# Patient Record
Sex: MALE | Race: WHITE | NOT HISPANIC OR LATINO | Employment: FULL TIME | URBAN - METROPOLITAN AREA
[De-identification: names, ages, dates, MRNs, and addresses within clinical notes are randomized per-mention and may not be internally consistent; named-entity substitution may affect disease eponyms.]

---

## 2017-02-28 ENCOUNTER — ALLSCRIPTS OFFICE VISIT (OUTPATIENT)
Dept: OTHER | Facility: OTHER | Age: 62
End: 2017-02-28

## 2017-02-28 DIAGNOSIS — G47.33 OBSTRUCTIVE SLEEP APNEA: ICD-10-CM

## 2017-02-28 DIAGNOSIS — Z98.84 BARIATRIC SURGERY STATUS: ICD-10-CM

## 2017-02-28 DIAGNOSIS — K91.2 POSTSURGICAL MALABSORPTION, NOT ELSEWHERE CLASSIFIED: ICD-10-CM

## 2017-02-28 DIAGNOSIS — Z00.00 ENCOUNTER FOR GENERAL ADULT MEDICAL EXAMINATION WITHOUT ABNORMAL FINDINGS: ICD-10-CM

## 2017-02-28 DIAGNOSIS — I10 ESSENTIAL (PRIMARY) HYPERTENSION: ICD-10-CM

## 2017-02-28 DIAGNOSIS — E11.9 TYPE 2 DIABETES MELLITUS WITHOUT COMPLICATIONS (HCC): ICD-10-CM

## 2017-03-07 ENCOUNTER — APPOINTMENT (OUTPATIENT)
Dept: LAB | Facility: HOSPITAL | Age: 62
End: 2017-03-07
Payer: COMMERCIAL

## 2017-03-07 ENCOUNTER — TRANSCRIBE ORDERS (OUTPATIENT)
Dept: ADMINISTRATIVE | Facility: HOSPITAL | Age: 62
End: 2017-03-07

## 2017-03-07 ENCOUNTER — APPOINTMENT (OUTPATIENT)
Dept: LAB | Facility: HOSPITAL | Age: 62
End: 2017-03-07
Attending: PODIATRIST
Payer: COMMERCIAL

## 2017-03-07 DIAGNOSIS — K59.00 UNSPECIFIED CONSTIPATION: ICD-10-CM

## 2017-03-07 DIAGNOSIS — E11.9 TYPE 2 DIABETES MELLITUS WITHOUT COMPLICATION, WITH LONG-TERM CURRENT USE OF INSULIN (HCC): ICD-10-CM

## 2017-03-07 DIAGNOSIS — Z79.4 TYPE 2 DIABETES MELLITUS WITHOUT COMPLICATION, WITH LONG-TERM CURRENT USE OF INSULIN (HCC): ICD-10-CM

## 2017-03-07 DIAGNOSIS — G47.33 OBSTRUCTIVE SLEEP APNEA: ICD-10-CM

## 2017-03-07 DIAGNOSIS — E11.9 TYPE 2 DIABETES MELLITUS WITHOUT COMPLICATIONS (HCC): ICD-10-CM

## 2017-03-07 DIAGNOSIS — I10 ESSENTIAL (PRIMARY) HYPERTENSION: ICD-10-CM

## 2017-03-07 DIAGNOSIS — Z98.84 BARIATRIC SURGERY STATUS: ICD-10-CM

## 2017-03-07 DIAGNOSIS — K91.2 POSTSURGICAL MALABSORPTION, NOT ELSEWHERE CLASSIFIED: ICD-10-CM

## 2017-03-07 DIAGNOSIS — Z00.00 ENCOUNTER FOR GENERAL ADULT MEDICAL EXAMINATION WITHOUT ABNORMAL FINDINGS: ICD-10-CM

## 2017-03-07 DIAGNOSIS — E11.9 TYPE 2 DIABETES MELLITUS WITHOUT COMPLICATION, WITH LONG-TERM CURRENT USE OF INSULIN (HCC): Primary | ICD-10-CM

## 2017-03-07 DIAGNOSIS — Z79.4 TYPE 2 DIABETES MELLITUS WITHOUT COMPLICATION, WITH LONG-TERM CURRENT USE OF INSULIN (HCC): Primary | ICD-10-CM

## 2017-03-07 LAB
25(OH)D3 SERPL-MCNC: 42.4 NG/ML (ref 30–100)
ALBUMIN SERPL BCP-MCNC: 3.5 G/DL (ref 3.5–5)
ALP SERPL-CCNC: 88 U/L (ref 46–116)
ALT SERPL W P-5'-P-CCNC: 43 U/L (ref 12–78)
ANION GAP SERPL CALCULATED.3IONS-SCNC: 9 MMOL/L (ref 4–13)
AST SERPL W P-5'-P-CCNC: 21 U/L (ref 5–45)
BASOPHILS # BLD AUTO: 0.03 THOUSANDS/ΜL (ref 0–0.1)
BASOPHILS NFR BLD AUTO: 1 % (ref 0–1)
BILIRUB SERPL-MCNC: 1 MG/DL (ref 0.2–1)
BUN SERPL-MCNC: 16 MG/DL (ref 5–25)
CALCIUM SERPL-MCNC: 8.6 MG/DL (ref 8.3–10.1)
CHLORIDE SERPL-SCNC: 104 MMOL/L (ref 100–108)
CHOLEST SERPL-MCNC: 159 MG/DL (ref 50–200)
CO2 SERPL-SCNC: 29 MMOL/L (ref 21–32)
CREAT SERPL-MCNC: 0.68 MG/DL (ref 0.6–1.3)
EOSINOPHIL # BLD AUTO: 0.17 THOUSAND/ΜL (ref 0–0.61)
EOSINOPHIL NFR BLD AUTO: 3 % (ref 0–6)
ERYTHROCYTE [DISTWIDTH] IN BLOOD BY AUTOMATED COUNT: 14.6 % (ref 11.6–15.1)
EST. AVERAGE GLUCOSE BLD GHB EST-MCNC: 151 MG/DL
FERRITIN SERPL-MCNC: 228 NG/ML (ref 8–388)
FOLATE SERPL-MCNC: >20 NG/ML (ref 3.1–17.5)
GFR SERPL CREATININE-BSD FRML MDRD: >60 ML/MIN/1.73SQ M
GLUCOSE SERPL-MCNC: 158 MG/DL (ref 65–140)
HBA1C MFR BLD: 6.9 % (ref 4.2–6.3)
HCT VFR BLD AUTO: 41.2 % (ref 36.5–49.3)
HDLC SERPL-MCNC: 51 MG/DL (ref 40–60)
HGB BLD-MCNC: 14.2 G/DL (ref 12–17)
LDLC SERPL CALC-MCNC: 87 MG/DL (ref 0–100)
LYMPHOCYTES # BLD AUTO: 2.37 THOUSANDS/ΜL (ref 0.6–4.47)
LYMPHOCYTES NFR BLD AUTO: 37 % (ref 14–44)
MCH RBC QN AUTO: 31.8 PG (ref 26.8–34.3)
MCHC RBC AUTO-ENTMCNC: 34.5 G/DL (ref 31.4–37.4)
MCV RBC AUTO: 92 FL (ref 82–98)
MONOCYTES # BLD AUTO: 0.39 THOUSAND/ΜL (ref 0.17–1.22)
MONOCYTES NFR BLD AUTO: 6 % (ref 4–12)
NEUTROPHILS # BLD AUTO: 3.4 THOUSANDS/ΜL (ref 1.85–7.62)
NEUTS SEG NFR BLD AUTO: 53 % (ref 43–75)
PLATELET # BLD AUTO: 257 THOUSANDS/UL (ref 149–390)
PMV BLD AUTO: 9.2 FL (ref 8.9–12.7)
POTASSIUM SERPL-SCNC: 3.9 MMOL/L (ref 3.5–5.3)
PROT SERPL-MCNC: 6.6 G/DL (ref 6.4–8.2)
PTH-INTACT SERPL-MCNC: 33.9 PG/ML (ref 14–72)
RBC # BLD AUTO: 4.47 MILLION/UL (ref 3.88–5.62)
SODIUM SERPL-SCNC: 142 MMOL/L (ref 136–145)
TRIGL SERPL-MCNC: 104 MG/DL
TSH SERPL DL<=0.05 MIU/L-ACNC: 1.85 UIU/ML (ref 0.36–3.74)
VIT B12 SERPL-MCNC: 2683 PG/ML (ref 100–900)
WBC # BLD AUTO: 6.36 THOUSAND/UL (ref 4.31–10.16)

## 2017-03-07 PROCEDURE — 82728 ASSAY OF FERRITIN: CPT

## 2017-03-07 PROCEDURE — 80053 COMPREHEN METABOLIC PANEL: CPT

## 2017-03-07 PROCEDURE — 82746 ASSAY OF FOLIC ACID SERUM: CPT

## 2017-03-07 PROCEDURE — 36415 COLL VENOUS BLD VENIPUNCTURE: CPT

## 2017-03-07 PROCEDURE — 82607 VITAMIN B-12: CPT

## 2017-03-07 PROCEDURE — 84443 ASSAY THYROID STIM HORMONE: CPT

## 2017-03-07 PROCEDURE — 85025 COMPLETE CBC W/AUTO DIFF WBC: CPT

## 2017-03-07 PROCEDURE — 84425 ASSAY OF VITAMIN B-1: CPT

## 2017-03-07 PROCEDURE — 84590 ASSAY OF VITAMIN A: CPT

## 2017-03-07 PROCEDURE — 80061 LIPID PANEL: CPT

## 2017-03-07 PROCEDURE — 83036 HEMOGLOBIN GLYCOSYLATED A1C: CPT

## 2017-03-07 PROCEDURE — 82306 VITAMIN D 25 HYDROXY: CPT

## 2017-03-07 PROCEDURE — 83970 ASSAY OF PARATHORMONE: CPT

## 2017-03-10 LAB — VIT B1 BLD-SCNC: 351.6 NMOL/L (ref 66.5–200)

## 2017-03-12 LAB — VIT A SERPL-MCNC: 43 UG/DL (ref 24–85)

## 2017-03-16 RX ORDER — METHYLPREDNISOLONE ACETATE 40 MG/ML
40 INJECTION, SUSPENSION INTRA-ARTICULAR; INTRALESIONAL; INTRAMUSCULAR; SOFT TISSUE ONCE
Status: CANCELLED | OUTPATIENT
Start: 2017-03-17

## 2017-03-16 RX ORDER — BUPIVACAINE HYDROCHLORIDE 5 MG/ML
2 INJECTION, SOLUTION EPIDURAL; INTRACAUDAL ONCE
Status: CANCELLED | OUTPATIENT
Start: 2017-03-17

## 2017-03-17 ENCOUNTER — ANESTHESIA EVENT (OUTPATIENT)
Dept: PERIOP | Facility: HOSPITAL | Age: 62
End: 2017-03-17
Payer: COMMERCIAL

## 2017-03-17 ENCOUNTER — HOSPITAL ENCOUNTER (OUTPATIENT)
Facility: HOSPITAL | Age: 62
Setting detail: OUTPATIENT SURGERY
Discharge: HOME | End: 2017-03-17
Attending: PODIATRIST | Admitting: PODIATRIST
Payer: COMMERCIAL

## 2017-03-17 ENCOUNTER — ANESTHESIA (OUTPATIENT)
Dept: PERIOP | Facility: HOSPITAL | Age: 62
End: 2017-03-17
Payer: COMMERCIAL

## 2017-03-17 VITALS
SYSTOLIC BLOOD PRESSURE: 122 MMHG | HEART RATE: 54 BPM | TEMPERATURE: 97.9 F | BODY MASS INDEX: 38.33 KG/M2 | OXYGEN SATURATION: 100 % | RESPIRATION RATE: 16 BRPM | WEIGHT: 283 LBS | DIASTOLIC BLOOD PRESSURE: 68 MMHG | HEIGHT: 72 IN

## 2017-03-17 DIAGNOSIS — G89.18 POSTOPERATIVE PAIN: Primary | ICD-10-CM

## 2017-03-17 LAB — GLUCOSE SERPL-MCNC: 147 MG/DL (ref 65–140)

## 2017-03-17 PROCEDURE — 82948 REAGENT STRIP/BLOOD GLUCOSE: CPT

## 2017-03-17 RX ORDER — OXYCODONE HYDROCHLORIDE 5 MG/1
5 TABLET ORAL EVERY 4 HOURS PRN
Status: DISCONTINUED | OUTPATIENT
Start: 2017-03-17 | End: 2017-03-17 | Stop reason: HOSPADM

## 2017-03-17 RX ORDER — SODIUM CHLORIDE, SODIUM LACTATE, POTASSIUM CHLORIDE, CALCIUM CHLORIDE 600; 310; 30; 20 MG/100ML; MG/100ML; MG/100ML; MG/100ML
20 INJECTION, SOLUTION INTRAVENOUS CONTINUOUS
Status: DISCONTINUED | OUTPATIENT
Start: 2017-03-17 | End: 2017-03-17 | Stop reason: HOSPADM

## 2017-03-17 RX ORDER — FENTANYL CITRATE/PF 50 MCG/ML
25 SYRINGE (ML) INJECTION
Status: DISCONTINUED | OUTPATIENT
Start: 2017-03-17 | End: 2017-03-17 | Stop reason: HOSPADM

## 2017-03-17 RX ORDER — FENTANYL CITRATE 50 UG/ML
INJECTION, SOLUTION INTRAMUSCULAR; INTRAVENOUS AS NEEDED
Status: DISCONTINUED | OUTPATIENT
Start: 2017-03-17 | End: 2017-03-17 | Stop reason: SURG

## 2017-03-17 RX ORDER — BUPIVACAINE HYDROCHLORIDE 5 MG/ML
INJECTION, SOLUTION EPIDURAL; INTRACAUDAL AS NEEDED
Status: DISCONTINUED | OUTPATIENT
Start: 2017-03-17 | End: 2017-03-17 | Stop reason: HOSPADM

## 2017-03-17 RX ORDER — MIDAZOLAM HYDROCHLORIDE 1 MG/ML
INJECTION INTRAMUSCULAR; INTRAVENOUS AS NEEDED
Status: DISCONTINUED | OUTPATIENT
Start: 2017-03-17 | End: 2017-03-17 | Stop reason: SURG

## 2017-03-17 RX ORDER — ONDANSETRON 2 MG/ML
4 INJECTION INTRAMUSCULAR; INTRAVENOUS ONCE
Status: DISCONTINUED | OUTPATIENT
Start: 2017-03-17 | End: 2017-03-17 | Stop reason: HOSPADM

## 2017-03-17 RX ORDER — CLINDAMYCIN PHOSPHATE 900 MG/50ML
900 INJECTION INTRAVENOUS
Status: COMPLETED | OUTPATIENT
Start: 2017-03-17 | End: 2017-03-17

## 2017-03-17 RX ORDER — PROPOFOL 10 MG/ML
INJECTION, EMULSION INTRAVENOUS AS NEEDED
Status: DISCONTINUED | OUTPATIENT
Start: 2017-03-17 | End: 2017-03-17 | Stop reason: SURG

## 2017-03-17 RX ORDER — METOCLOPRAMIDE HYDROCHLORIDE 5 MG/ML
10 INJECTION INTRAMUSCULAR; INTRAVENOUS ONCE AS NEEDED
Status: DISCONTINUED | OUTPATIENT
Start: 2017-03-17 | End: 2017-03-17 | Stop reason: HOSPADM

## 2017-03-17 RX ORDER — MAGNESIUM HYDROXIDE 1200 MG/15ML
LIQUID ORAL AS NEEDED
Status: DISCONTINUED | OUTPATIENT
Start: 2017-03-17 | End: 2017-03-17 | Stop reason: HOSPADM

## 2017-03-17 RX ORDER — LIDOCAINE HYDROCHLORIDE 10 MG/ML
INJECTION, SOLUTION EPIDURAL; INFILTRATION; INTRACAUDAL; PERINEURAL AS NEEDED
Status: DISCONTINUED | OUTPATIENT
Start: 2017-03-17 | End: 2017-03-17 | Stop reason: HOSPADM

## 2017-03-17 RX ORDER — OXYCODONE HYDROCHLORIDE AND ACETAMINOPHEN 5; 325 MG/1; MG/1
1 TABLET ORAL EVERY 4 HOURS PRN
Qty: 20 TABLET | Refills: 0 | Status: SHIPPED | OUTPATIENT
Start: 2017-03-17 | End: 2017-03-27

## 2017-03-17 RX ORDER — DEXAMETHASONE SODIUM PHOSPHATE 4 MG/ML
INJECTION, SOLUTION INTRA-ARTICULAR; INTRALESIONAL; INTRAMUSCULAR; INTRAVENOUS; SOFT TISSUE AS NEEDED
Status: DISCONTINUED | OUTPATIENT
Start: 2017-03-17 | End: 2017-03-17 | Stop reason: HOSPADM

## 2017-03-17 RX ADMIN — MIDAZOLAM HYDROCHLORIDE 2 MG: 1 INJECTION, SOLUTION INTRAMUSCULAR; INTRAVENOUS at 12:25

## 2017-03-17 RX ADMIN — FENTANYL CITRATE 100 MCG: 50 INJECTION, SOLUTION INTRAMUSCULAR; INTRAVENOUS at 12:25

## 2017-03-17 RX ADMIN — CLINDAMYCIN PHOSPHATE 900 MG: 18 INJECTION, SOLUTION INTRAMUSCULAR; INTRAVENOUS at 12:25

## 2017-03-17 RX ADMIN — PROPOFOL 150 MG: 10 INJECTION, EMULSION INTRAVENOUS at 12:27

## 2017-03-17 RX ADMIN — OXYCODONE HYDROCHLORIDE 5 MG: 5 TABLET ORAL at 15:51

## 2017-03-17 RX ADMIN — SODIUM CHLORIDE, SODIUM LACTATE, POTASSIUM CHLORIDE, AND CALCIUM CHLORIDE 20 ML/HR: .6; .31; .03; .02 INJECTION, SOLUTION INTRAVENOUS at 11:41

## 2017-04-05 ENCOUNTER — GENERIC CONVERSION - ENCOUNTER (OUTPATIENT)
Dept: OTHER | Facility: OTHER | Age: 62
End: 2017-04-05

## 2017-05-30 ENCOUNTER — ALLSCRIPTS OFFICE VISIT (OUTPATIENT)
Dept: OTHER | Facility: OTHER | Age: 62
End: 2017-05-30

## 2017-06-06 ENCOUNTER — GENERIC CONVERSION - ENCOUNTER (OUTPATIENT)
Dept: OTHER | Facility: OTHER | Age: 62
End: 2017-06-06

## 2017-07-20 ENCOUNTER — GENERIC CONVERSION - ENCOUNTER (OUTPATIENT)
Dept: OTHER | Facility: OTHER | Age: 62
End: 2017-07-20

## 2017-10-30 DIAGNOSIS — E11.9 TYPE 2 DIABETES MELLITUS WITHOUT COMPLICATIONS (HCC): ICD-10-CM

## 2017-10-30 DIAGNOSIS — I10 ESSENTIAL (PRIMARY) HYPERTENSION: ICD-10-CM

## 2017-10-30 DIAGNOSIS — Z98.84 BARIATRIC SURGERY STATUS: ICD-10-CM

## 2017-10-30 DIAGNOSIS — Z00.00 ENCOUNTER FOR GENERAL ADULT MEDICAL EXAMINATION WITHOUT ABNORMAL FINDINGS: ICD-10-CM

## 2017-10-30 DIAGNOSIS — K91.2 POSTSURGICAL MALABSORPTION, NOT ELSEWHERE CLASSIFIED: ICD-10-CM

## 2017-10-30 DIAGNOSIS — G47.33 OBSTRUCTIVE SLEEP APNEA: ICD-10-CM

## 2017-10-30 DIAGNOSIS — E78.5 HYPERLIPIDEMIA: ICD-10-CM

## 2018-01-09 NOTE — RESULT NOTES
Dear Afshin Guthrie,   Your test results have returned and are listed below:      Discussion/Summary  Your results show some abnormalities  Your hemoglobin A1c is elevated at 10 9%, which indicates elevated blood sugar levels  Elevated blood sugar can increase your risk of infection after surgery and delay wound healing  Your hemoglobin A1c should be below 10 0% prior to surgery to decrease your risks  Recommend that you see an endocrinologist for a consultation to control your blood sugars  Enclosed is a list of endocrinologists that work with Jadon Capellan   After your appointment with the endocrinologist, please have the consult letter forwarded to the bariatric office  A lab slip is enclosed for you to recheck your levels again in 2-3 months  Please keep your regularly scheduled follow-up appointment  If you do not have a follow-up scheduled, please call the office to schedule a follow-up visit  If you have any questions, please don't hesitate to call the office     Sincerely,      Signatures   Electronically signed by : DOUGLAS Harding RD; Sep  7 2016  3:29PM EST                       (Author)    Electronically signed by : THIAGO Hanson ; Sep  8 2016  1:08PM EST                       (Validation)

## 2018-01-10 NOTE — PROGRESS NOTES
Message  Called and left message to review first post op dietary information  I was teaching team meeting when he had his appointment   Name and contact information left  Mailed out information on [employer] and mens facebook      Active Problems    1  Arthritis (716 90) (M19 90)   2  Benign essential hypertension (401 1) (I10)   3  Blood tests prior to treatment or procedure (V72 63) (Z01 812)   4  Degenerative joint disease (715 90) (M19 90)   5  Diabetes mellitus, type II (250 00) (E11 9)   6  Edema (782 3) (R60 9)   7  Hyperlipemia (272 4) (E78 5)   8  Joint pain (719 40) (M25 50)   9  Morbid obesity (278 01) (E66 01)   10  Neuropathy (355 9) (G62 9)   11  Obesity (278 00) (E66 9)   12  Obstructive sleep apnea (327 23) (G47 33)   13  Postgastrectomy malabsorption (579 3) (K91 2,Z90 3)   14  Preoperative clearance (V72 84) (Z01 818)   15  RBBB (right bundle branch block) (426 4) (I45 10)   16  Status post gastric bypass for obesity (V45 86) (Z98 84)   17  Swelling of multiple joints (719 09) (M25 40)    Current Meds   1  Daily Multivitamin TABS; Therapy: (Recorded:03Omg4933) to Recorded   2  Lyrica 150 MG Oral Capsule; TAKE 2 CAPSULES 3 TIMES DAILY; Therapy: (Recorded:44Hdf7828) to Recorded   3  Omeprazole 20 MG Oral Capsule Delayed Release; take 1 capsule daily; Therapy: 54UJO7406 to (Evaluate:44Ffq1150)  Requested for: 10KJL1862; Last   Rx:20Oct2016 Ordered    Allergies    1   Penicillins    Signatures   Electronically signed by : JUAN Roque; Nov  3 2016  2:20PM EST                       (Author)

## 2018-01-12 VITALS
RESPIRATION RATE: 18 BRPM | HEIGHT: 72 IN | SYSTOLIC BLOOD PRESSURE: 122 MMHG | WEIGHT: 287 LBS | BODY MASS INDEX: 38.87 KG/M2 | HEART RATE: 70 BPM | TEMPERATURE: 98.5 F | DIASTOLIC BLOOD PRESSURE: 66 MMHG

## 2018-01-12 NOTE — PROGRESS NOTES
History of Present Illness  Bariatric MNT [facility] Screening Preop [facility]:     He was on time  the appointment lasted: 30 minutes  His surgeon is Dr Nely Bejarano  The patient was present at the session  The diagnosis/reason for the appointment is: He has Grade III Obesity with a BMI of 49 8  Diet Order: Nutritional Assesment for Bariatric Surgery  His goal weight is 239 5#-257 5#  He has the following comorbidities: diabetes type II, hypertension and Arthritis, DJD, TKR, Edema, Diabetic Neuropathy, DVT, Kidney Stones, Lithotripsy  Umbilical Hernia Repair  Labs: Edgardo Atkins He was reminded to have his labs drawn   He does not need to monitor his glucose  His casual blood glucose is 140 was last read last HgbA1c between 7 0-8 0% per pt   Exercise Frequency:  He does not exercise  Relationship to food: He is a stress eater and eats large portions  He knows the difference between being comfortably full and uncomfortably full and knows the difference between being stuffed and comfortably full  He did not complete a food journal He drinks 12-20 cups of water daily He drinks 2-3 caffienated beverages daily His motivators are:pt wants to decrease pain, improve health and medical conditions, live longer  His obesity/being overweight is related to positive energy balance and is evidenced by: BMI=49 8  Knowledge Deficit Prior to Education  He Pt had previous bariatric surgery dietary evaluation and education in April of 2015 and remembers most imformation previously provided  Provided pt with brief review  Barriers to education:  He has no barriers to education  Medical Nutrition Therapy Intervention: Individualized Meal Plan, Lifestyle /Behavior Modification Techniques, Basic Pathophysiology of Obesity, Checklist of the Overview of Lesson Plans and Surgical changes to Stomach/GI     Area's Reviewed: Post - surgery goal weight, Web forum, Lifestyle [place] Modification Techniques, Lesson Plans in [place] and Pre- surgery goals /rules  Brief Review of Vitamins, diet progression for post-op and Pathophysiology of Obesity  His comprehension of the presented material was good  His receptivity to the presented material was fair  His motivation was good  Provided: Nutrition Guidelines for Gastric Surgery, Bariatric Program Pre- Surgery Nutrition and Goals  Goals: His set goal for physical activity is walk , for 30-60 minutes, 5-7 days a week  Review [employer] in [place]   Post Surgery: He will adhere to the diet progression: remain hydrated, consume adequate protein; and take vitamins as outlined in guidelines  Patient is a 64year old who is here for nutritional evaluation for weight loss surgery  I reviewed co-morbidities and medications with patient  He first recalls having problems with weight gain at the age of 54  He has dieted in the past with variable success but would regain the weight back  (Refer to diet history for details)  For his personal pre-op goals he will: complete one lesson plan in bariatric manual each week  He was not interested in working on a specific number of calories, but plans to food journal to track his intake  He was instructed on the importance of consistent vitamin and mineral intake after his surgery to prevent deficiencies  He currently takes a multivitamin pack daily  Reviewed importance of support after surgery and discussed the [facility] [employer], [employer], pep rally and support group  Patient states adequate knowledge of nutrition, exercise and behavior modification required for long term success  Pt  is recommended for surgery and is aware to practice lifestyle modification, complete all six lesson plans in bariatric manual, and complete two-week total liquid diet to lose weight prior to surgery  Provided patient with samples of bariatric products to try prior to surgery  Recommendations: He was provided contact information for any questions  He is recommended for surgery   ~He/She needs additional education  He states adequate knowledge of nutrition, exercise, and behavior modification  He will attend a Team Meeting approximately 2-3 weeks prior to surgery  Bariatric Behavioral Health Evaluation [address] [place] [address] [address]:   He is here today because: Increase health, increase mobility, reduce chronic pain  He is seeking a Bariatric surgery evaluation  He researched this option for: 4 years  He realizes the post-op requirements has previously discussed with PCP and has numerous community contacts with bariatric surgery   His Psychiatric/Psychological diagnosis: He does not have an outpatient counselor  He does not have a Psychiatrist    He has not had Inpatient Treatment  Family Constellation: Family Constellation: Mother: heart disease,  Father: hypertension, obesity, diabetes,  Siblings: heart disease, birth defect (death), hypertension, obesity  Spouse: cancer history  Children: overweight,  Other: obesity, alcohol and drug abuse  He lives withhis spouse  Domestic Violence: has not happened  Abuse History: (denies childhood trauma)   Additional Comments: health, weight, work, chronic pain  Physical/psychological assessment Appearance: appropriate   Sociability: average  Affect: appropriate  Mood: calm  Thought Process: coherent  Speech: normal  Content: no impairment  The patient was oriented to person, oriented to place, oriented to time and normal memory   normal attention span  no decreased concentration ability  normal judgment  His emotional insight was: fair  His intellectual insight was: fair  Risk assessment: The patient is currently asymptomatic  No associated symptoms are reported  Sexual history: He does not have a history of STD's  Recommendations: He is recommended for surgery  The Following Ratings are based on my: Obsevation of this individual over the last  Risk of Harm to Self or Others: The following are demographic risk factors associated with harm to self: male     The following are demographic risk factors associated with harm to others: male  (n/a)  Recent Specific Risk Factors: The patient is currently asymptomatic  No associated symptoms are reported  Summary and Recommendations:   Low: No thoughts or occasional thoughts of suicide, but no intent or actions  Self inflicted scratches, abrasions, or other self- injurious of behavior where medical attention is typically not warranted  No elements of homicidality or occasional thoughts but no plan, intent, or actions  Active Problems    1  Arthritis (716 90) (M19 90)   2  Benign essential hypertension (401 1) (I10)   3  Degenerative joint disease (715 90) (M19 90)   4  Diabetes mellitus, type II (250 00) (E11 9)   5  Edema (782 3) (R60 9)   6  Joint pain (719 40) (M25 50)   7  Morbid obesity (278 01) (E66 01)   8  Neuropathy (355 9) (G62 9)   9  Obstructive sleep apnea (327 23) (G47 33)   10  Swelling of multiple joints (719 09) (M25 40)    Past Medical History    1  History of deep venous thrombosis (V12 51) (Z86 718)   2  History of renal calculi (V13 01) (C47 656)    Surgical History    1  History of Knee Replacement   2  History of Oral Surgery Tooth Extraction   3  History of Renal Lithotripsy   4  History of Umbilical Hernia Repair    Family History  Mother    1  Family history of cardiac disorder (V17 49) (Z82 49)   2  Family history of diabetes mellitus (V18 0) (Z83 3)   3  Family history of hypertension (V17 49) (Z82 49)  Father    4  Family history of diabetes mellitus (V18 0) (Z83 3)   5  Family history of hypertension (V17 49) (Z82 49)  Brother    10  Family history of cardiac disorder (V17 49) (Z82 49)   7  Family history of diabetes mellitus (V18 0) (Z83 3)   8  Family history of hypertension (V17 49) (Z82 49)    Social History    · Denied: History of Drug use   · Never a smoker   · No alcohol use    Current Meds   1  Daily Multivitamin TABS; Therapy: (Recorded:33Ncu8716) to Recorded   2   Diovan 320 MG Oral Tablet; TAKE 1 TABLET DAILY; Therapy: (Recorded:08Apr2015) to Recorded   3  Lyrica 150 MG Oral Capsule; TAKE 2 CAPSULES 3 TIMES DAILY; Therapy: (Recorded:08Apr2015) to Recorded   4  MetFORMIN HCl TABS; Therapy: (Recorded:16Aug2016) to Recorded   5  Nucynta  MG Oral Tablet Extended Release 12 Hour; Therapy: (Recorded:16Aug2016) to Recorded   6  ROPINIRole HCl - 1 MG Oral Tablet; i po qd; Therapy: (Recorded:08Apr2015) to Recorded   7  Simvastatin 40 MG Oral Tablet; Therapy: (Recorded:16Aug2016) to Recorded    Allergies    1  Penicillins     Note   Note:   Patient denies Axis I diagnosis or treatment  Patient educated regarding the impact of nicotine and alcohol on the post bariatric patient  Patient meets criteria for surgery at this program and is referred to physician  Future Appointments    Date/Time Provider Specialty Site   09/02/2016 09:45 AM THIAGO Diaz  General Surgery [facility]   09/16/2016 09:40 AM THIAGO Pena  Cardiology [address]     Signatures   Electronically signed by : Nathaniel Juan, LCSWMSWMSW,LCSW; Aug 16 2016  9:35AM EST                       (Author)    Electronically signed by : DOUGLAS Garcia RD; Aug 16 2016  3:17PM EST                       (Author)    Electronically signed by :  THIAGO Kahn ; Aug 17 2016  2:57PM EST

## 2018-01-12 NOTE — PROGRESS NOTES
Message  Received referral from Dr Stefany Chaudhary to follow-up with pt  Called pt, phone went straight to voicemail  Left message with contact # and office hours requesting a return call to schedule follow-up  Active Problems    1  Arthritis (716 90) (M19 90)   2  Benign essential hypertension (401 1) (I10)   3  Blood tests prior to treatment or procedure (V72 63) (Z01 812)   4  Degenerative joint disease (715 90) (M19 90)   5  Diabetes mellitus, type II (250 00) (E11 9)   6  Edema (782 3) (R60 9)   7  Joint pain (719 40) (M25 50)   8  Morbid obesity (278 01) (E66 01)   9  Neuropathy (355 9) (G62 9)   10  Obstructive sleep apnea (327 23) (G47 33)   11  Swelling of multiple joints (719 09) (M25 40)    Current Meds   1  Daily Multivitamin TABS; Therapy: (Recorded:11Fip3906) to Recorded   2  Diovan 320 MG Oral Tablet (Valsartan); TAKE 1 TABLET DAILY; Therapy: (Recorded:35Xcl1806) to Recorded   3  Lyrica 150 MG Oral Capsule; TAKE 2 CAPSULES 3 TIMES DAILY; Therapy: (Recorded:15Usd5339) to Recorded   4  MetFORMIN HCl TABS; Therapy: (Recorded:92Tkj0173) to Recorded   5  Nucynta  MG Oral Tablet Extended Release 12 Hour; Therapy: (Recorded:96Aty0006) to Recorded   6  ROPINIRole HCl - 1 MG Oral Tablet; i po qd; Therapy: (Recorded:34Sgm8497) to Recorded   7  Simvastatin 40 MG Oral Tablet; Therapy: (Recorded:03Cye7288) to Recorded    Allergies    1   Penicillins    Signatures   Electronically signed by : DOUGLAS Finley,RD; Sep  2 2016  3:31PM EST                       (Author)

## 2018-01-13 NOTE — MISCELLANEOUS
Message  10/28/2016 @ 1200 - post op follow up phone call completed  Pt is sipping liquids, using IS as instructed, reinforced importance of using IS to help prevent pneumonia  Ambulating about home without difficulty  Minimal pain, not using Percocet  Reaffirmed examples of liquid diet over the next week  Multiple questions regarding vitamins and how to separate were answered  Pt stated understanding about discharge instructions and medication adjustments  Pt educated on [id number] [facility]  Follow up appt with surgeon scheduled for next week  Instructed to call with any additional questions or concerns  Active Problems    1  Arthritis (716 90) (M19 90)   2  Benign essential hypertension (401 1) (I10)   3  Blood tests prior to treatment or procedure (V72 63) (Z01 812)   4  Degenerative joint disease (715 90) (M19 90)   5  Diabetes mellitus, type II (250 00) (E11 9)   6  Edema (782 3) (R60 9)   7  Hyperlipemia (272 4) (E78 5)   8  Joint pain (719 40) (M25 50)   9  Morbid obesity (278 01) (E66 01)   10  Neuropathy (355 9) (G62 9)   11  Obesity (278 00) (E66 9)   12  Obstructive sleep apnea (327 23) (G47 33)   13  Preoperative clearance (V72 84) (Z01 818)   14  RBBB (right bundle branch block) (426 4) (I45 10)   15  Swelling of multiple joints (719 09) (M25 40)    Current Meds   1  Daily Multivitamin TABS; Therapy: (Recorded:08Aeu1229) to Recorded   2  Diovan 320 MG Oral Tablet (Valsartan); TAKE 1 TABLET DAILY; Therapy: (Recorded:08Apr2015) to Recorded   3  Glucophage 1000 MG Oral Tablet (MetFORMIN HCl); TAKE 1 TABLET TWICE DAILY AS   DIRECTED; Therapy: (Recorded:51Kkq8328) to Recorded   4  GlyBURIDE 5 MG Oral Tablet; Therapy: (Recorded:76Zyj6152) to Recorded   5  Lyrica 150 MG Oral Capsule; TAKE 2 CAPSULES 3 TIMES DAILY; Therapy: (Recorded:77Rjz6469) to Recorded   6  Nucynta  MG Oral Tablet Extended Release 12 Hour; Therapy: (Recorded:47Gxa1321) to Recorded   7   Omeprazole 20 MG Oral Capsule Delayed Release; take 1 capsule daily; Therapy: 21FMC1435 to (Evaluate:33Mab4772)  Requested for: 20Oct2016; Last   Rx:20Oct2016 Ordered   8  ROPINIRole HCl - 1 MG Oral Tablet; i po qd; Therapy: (Recorded:08Apr2015) to Recorded   9  Simvastatin 40 MG Oral Tablet; Therapy: (Recorded:93Vri3969) to Recorded    Allergies    1   Penicillins    Signatures   Electronically signed by : Rei Newman, ; Oct 28 2016 12:23PM EST                       (Author)

## 2018-01-14 NOTE — PROGRESS NOTES
Active Problems    1  Arthritis (716 90) (M19 90)   2  Benign essential hypertension (401 1) (I10)   3  Blood tests prior to treatment or procedure (V72 63) (Z01 812)   4  Degenerative joint disease (715 90) (M19 90)   5  Diabetes mellitus, type II (250 00) (E11 9)   6  Edema (782 3) (R60 9)   7  Joint pain (719 40) (M25 50)   8  Morbid obesity (278 01) (E66 01)   9  Neuropathy (355 9) (G62 9)   10  Obstructive sleep apnea (327 23) (G47 33)   11  Swelling of multiple joints (719 09) (M25 40)    Current Meds   1  Daily Multivitamin TABS; Therapy: (Recorded:97Uda2806) to Recorded   2  Diovan 320 MG Oral Tablet (Valsartan); TAKE 1 TABLET DAILY; Therapy: (Recorded:08Apr2015) to Recorded   3  Lyrica 150 MG Oral Capsule; TAKE 2 CAPSULES 3 TIMES DAILY; Therapy: (Recorded:08Apr2015) to Recorded   4  MetFORMIN HCl TABS; Therapy: (Recorded:95Wym6670) to Recorded   5  Nucynta  MG Oral Tablet Extended Release 12 Hour; Therapy: (Recorded:48Ooz7823) to Recorded   6  ROPINIRole HCl - 1 MG Oral Tablet; i po qd; Therapy: (Recorded:08Apr2015) to Recorded   7  Simvastatin 40 MG Oral Tablet; Therapy: (Recorded:93Bxr7261) to Recorded    Allergies    1  Penicillins    Discussion/Summary    Pt returned phone call  Discussed pt's HgbA1c results with pt, explained infection risk with surgery, explained recommendations to consult with an endocrinologist for better blood glucose control and to re-check HgbA1c in 2-3 months  Pt states if he cannot have his surgery soon he may not be able to at all due to work constraints  Scheduled follow-up for tomorrow at 10:30am to review diet and begin working on weight loss        Signatures   Electronically signed by : DOUGLAS Mac RD; Sep  8 2016  2:35PM EST                       (Author)

## 2018-01-15 VITALS
WEIGHT: 290 LBS | SYSTOLIC BLOOD PRESSURE: 112 MMHG | HEART RATE: 72 BPM | RESPIRATION RATE: 18 BRPM | DIASTOLIC BLOOD PRESSURE: 70 MMHG | HEIGHT: 72 IN | TEMPERATURE: 97 F | BODY MASS INDEX: 39.28 KG/M2

## 2018-01-15 NOTE — PROGRESS NOTES
Discussion/Summary  Discussion Summary:   Assess: 1 7# wt gain from previous office visit last week  Net 0 7# overall wt gain  Pt needs 18-20# wt loss per Dr Darcie Gorman  Pt is taking Glyburide and Metformin for his diabetes and states he checks his fasting blood sugar once daily in the morning, results usually 140 mg/dL  Pt states he has been studying his manual and has read most of it  Pt states he only drinks non-caloric beverages  Diagnose: Overweight/Obesity related to positive energy balance as evidenced by BMI >30 and pt's self-reported energy intake  (Continued)  Intervene: Discussed pt's lab results and elevated HgbA1c with pt  Discussed possible surgical infection risk and wound healing  Discussed impact of weight loss and regular exercise on blood sugars and emphasized importance of checking blood glucose several times daily, both fasting and post-prandially  Provided pt with list of [facility] affiliated endocrinologists  Pt states he will see endocrinologist in [facility]  Provided pt with total liquid diet instructions, protein supplement suggestions, nonstarchy vegetables list, and low-calorie beverages handouts and reviewed instructions with pt  Pt will purchase protein powder from [employer] today and begin total liquid diet  Pt will check blood sugars several times daily  Reviewed workflow with pt: Pt needs 18-20# wt loss, PCP letter, Cardiac clearance (scheduled for 9/16/16), Support Group (plans on attending 9/21/16), highly recommended endocrinology consult  Pt verbalized understanding, no further questions at present, expect good compliance  Monitor/Evaluate: Will monitor food journals, weight, pt's reported compliance with goals at next appointment  Follow-up scheduled for: Friday, 9/16/16 at 11:00am       Active Problems    1  Arthritis (716 90) (M19 90)   2  Benign essential hypertension (401 1) (I10)   3  Blood tests prior to treatment or procedure (V72 63) (Z01 812)   4  Degenerative joint disease (715 90) (M19 90)   5  Diabetes mellitus, type II (250 00) (E11 9)   6  Edema (782 3) (R60 9)   7  Joint pain (719 40) (M25 50)   8  Morbid obesity (278 01) (E66 01)   9  Neuropathy (355 9) (G62 9)   10  Obstructive sleep apnea (327 23) (G47 33)   11  Swelling of multiple joints (719 09) (M25 40)    Past Medical History    1  History of deep venous thrombosis (V12 51) (Z86 718)   2  History of renal calculi (V13 01) (Y64 778)    Surgical History    1  History of Knee Replacement   2  History of Oral Surgery Tooth Extraction   3  History of Renal Lithotripsy   4  History of Umbilical Hernia Repair    Family History  Mother    1  Family history of cardiac disorder (V17 49) (Z82 49)   2  Family history of diabetes mellitus (V18 0) (Z83 3)   3  Family history of hypertension (V17 49) (Z82 49)  Father    4  Family history of diabetes mellitus (V18 0) (Z83 3)   5  Family history of hypertension (V17 49) (Z82 49)  Brother    10  Family history of cardiac disorder (V17 49) (Z82 49)   7  Family history of diabetes mellitus (V18 0) (Z83 3)   8  Family history of hypertension (V17 49) (Z82 49)    Social History    · Denied: History of Drug use   · Never a smoker   · No alcohol use    Current Meds   1  Daily Multivitamin TABS; Therapy: (Recorded:92Zfo7476) to Recorded   2  Diovan 320 MG Oral Tablet; TAKE 1 TABLET DAILY; Therapy: (Recorded:08Apr2015) to Recorded   3  Lyrica 150 MG Oral Capsule; TAKE 2 CAPSULES 3 TIMES DAILY; Therapy: (Recorded:08Apr2015) to Recorded   4  MetFORMIN HCl TABS; Therapy: (Recorded:15Cxr5451) to Recorded   5  Nucynta  MG Oral Tablet Extended Release 12 Hour; Therapy: (Recorded:63Qxp5932) to Recorded   6  ROPINIRole HCl - 1 MG Oral Tablet; i po qd; Therapy: (Recorded:08Apr2015) to Recorded   7  Simvastatin 40 MG Oral Tablet; Therapy: (Recorded:49Plp0642) to Recorded    Allergies    1   Penicillins    Vitals  Signs   Recorded: 85BSK4242 02:19PM   Height: 5 ft 11 5 in  Weight: 368 lb 3 2 oz  BMI Calculated: 50 64  BSA Calculated: 2 75    Future Appointments    Date/Time Provider Specialty Site   09/16/2016 09:40 AM THIAGO Clayton   Cardiology [address]     Signatures   Electronically signed by : DOUGLAS Carson RD; Sep  9 2016  2:20PM EST                       (Author)    Electronically signed by : THIAGO Mcclelland ; Sep  9 2016  3:18PM EST                       (Validation)

## 2018-01-15 NOTE — RESULT NOTES
Results  (1) COMPREHENSIVE METABOLIC PANEL [id number] 95:98TQ [facility] Order Number: [id number]     Test Name Result Flag Reference   GLUCOSE,RANDM 158 mg/dL H    If the patient is fasting, the ADA then defines impaired fasting glucose as > 100 mg/dL and diabetes as > or equal to 123 mg/dL  SODIUM 142 mmol/L  136-145   POTASSIUM 3 9 mmol/L  3 5-5 3   CHLORIDE 104 mmol/L  100-108   CARBON DIOXIDE 29 mmol/L  21-32   ANION GAP (CALC) 9 mmol/L  4-13   BLOOD UREA NITROGEN 16 mg/dL  5-25   CREATININE 0 68 mg/dL  0 60-1 30   Standardized to IDMS reference method   CALCIUM 8 6 mg/dL  8 3-10 1   BILI, TOTAL 1 00 mg/dL  0 20-1 00   ALK PHOSPHATAS 88 U/L     ALT (SGPT) 43 U/L  12-78   AST(SGOT) 21 U/L  5-45   ALBUMIN 3 5 g/dL  3 5-5 0   TOTAL PROTEIN 6 6 g/dL  6 4-8 2   eGFR Non-African American      >60 0 ml/min/1 73sq m   - Patient Instructions: This is a fasting blood test  Water,black tea or black  coffee only after 9:00pm the night before test Drink 2 glasses of water the morning of test   National Kidney Disease Education Program recommendations are as follows:  GFR calculation is accurate only with a steady state creatinine  Chronic Kidney disease less than 60 ml/min/1 73 sq  meters  Kidney failure less than 15 ml/min/1 73 sq  meters  (1) FERRITIN 79XUZ1279 07:07AM [facility] Order Number: [id number]     Test Name Result Flag Reference   FERRITIN 228 ng/mL  8-388     (1) FOLATE 45SLD3154 07:07AM [facility] Order Number: [id number]     Test Name Result Flag Reference   FOLATE >20 0 ng/mL H 3 1-17 5     (1) LIPID PANEL, FASTING 12QCC2806 07:07AM [facility] Order Number: [id number]     Test Name Result Flag Reference   CHOLESTEROL 159 mg/dL     HDL,DIRECT 51 mg/dL  40-60   Specimen collection should occur prior to Metamizole administration due to the potential for falsely depressed results     LDL CHOLESTEROL CALCULATED 87 mg/dL  0-100   - Patient Instructions: This is a fasting blood test  Water,black tea or black  coffee only after 9:00pm the night before test   Drink 2 glasses of water the morning of test     - Patient Instructions: This is a fasting blood test  Water,black tea or black  coffee only after 9:00pm the night before test Drink 2 glasses of water the morning of test   Triglyceride:         Normal              <150 mg/dl       Borderline High    150-199 mg/dl       High               200-499 mg/dl       Very High          >499 mg/dl  Cholesterol:         Desirable        <200 mg/dl      Borderline High  200-239 mg/dl      High             >239 mg/dl  HDL Cholesterol:        High    >59 mg/dL      Low     <41 mg/dL  LDL CALCULATED:    This screening LDL is a calculated result  It does not have the accuracy of the Direct Measured LDL in the monitoring of patients with hyperlipidemia and/or statin therapy  Direct Measure LDL (JYB475) must be ordered separately in these patients  TRIGLYCERIDES 104 mg/dL  <=150   Specimen collection should occur prior to N-Acetylcysteine or Metamizole administration due to the potential for falsely depressed results       (1) PTH N-TERMINAL (INTACT) [id number] 07:07AM [facility] Order Number: [id number]     Test Name Result Flag Reference   PARATHYROID HORMONE INTACT 33 9 pg/mL  14 0-72 0     (1) VITAMIN A 10UTT6809 07:07AM [facility] Order Number: [id number]     Test Name Result Flag Reference   VITAMIN A 43 ug/dL  24 - 85   Performed at:  [address]  [id number]  : Rolanda Sanchez MD, Phone:  [phone]     (1) VITAMIN B1, WHOLE BLOOD 52FLK8189 07:07[facility] Order Number: [id number]     Test Name Result Flag Reference   VITAMIN B1, WHOLE BLOOD 351 6 nmol/L H 66 5 - 200 0   Performed at:  [address] [id number]  : Kike Jones MD, Phone:  [phone]     (1) VITAMIN B12 87VTB2526 07:07AM [facility] Order Number: [id number]     Test Name Result Flag Reference   VITAMIN B12 2683 pg/mL H 100-900     (1) VITAMIN D 25-HYDROXY 10TTN1715 07:07AM [facility] Order Number: [id number]     Test Name Result Flag Reference   VIT D 25-HYDROX 42 4 ng/mL  30 0-100 0   This assay is a certified procedure of the CDC Vitamin D Standardization Certification Program (VDSCP)     Deficiency <20ng/ml   Insufficiency 20-30ng/ml   Sufficient  ng/ml     *Patients undergoing fluorescein dye angiography may retain small amounts of fluorescein in the body for 48-72 hours post procedure  Samples containing fluorescein can produce falsely elevated Vitamin D values  If the patient had this procedure, a specimen should be resubmitted post fluorescein clearance  Discussion/Summary  Your results show some abnormalities  3/7/17 labs reviewed  Your blood sugar, if it was fasting was high the day you had your labs at 158-which is in the "diabetic range"-please review this with your PCP or endocrinologist at a routine visit  Continued weight loss (IF NEEDED) can help improve this level  Avoiding simple sugars and having more âcomplex carbohydratesâ in the diet -like vegetables, fruits and whole grains as your diet allows can also help  Your vitamin B1 is very high at 351 6 and your vitamin b12 is very high at 2683-in general if these levels go high they are usually NOT harmful, but if you are taking EXTRA vitamin B1 (thiamine) or extra vitamin B12, I would stop this now  Please keep your routine office visit  If you do not have a scheduled routine appointment, please call the office to schedule it  Our office number is [phone]  If you have questions about your results, this will be discussed with you at your upcoming  visit    If you have gotten your most recent labs after your recent visit and have questions, please call the office  I look forward to seeing you at your next visit  Signatures   Electronically signed by : GRACIE Adler;  Apr 5 2017  6:50PM EST                       (Author)

## 2018-01-16 NOTE — PROGRESS NOTES
Message  Received e-mail referral from KRYSTYNA River:  "Meet with RD and  to help you optimize overall weight loss "    Called pt, left voicemail message with contact # and office hours requesting return call to schedule  Active Problems    1  Arthritis (716 90) (M19 90)   2  Benign essential hypertension (401 1) (I10)   3  Blood tests prior to treatment or procedure (V72 63) (Z01 812)   4  Degenerative joint disease (715 90) (M19 90)   5  Diabetes mellitus, type II (250 00) (E11 9)   6  Edema (782 3) (R60 9)   7  Hyperlipemia (272 4) (E78 5)   8  Joint pain (719 40) (M25 50)   9  Morbid obesity (278 01) (E66 01)   10  Neuropathy (355 9) (G62 9)   11  Obesity (278 00) (E66 9)   12  Obstructive sleep apnea (327 23) (G47 33)   13  Other constipation (564 09) (K59 09)   14  Postgastrectomy malabsorption (579 3) (K91 2,Z90 3)   15  Preoperative clearance (V72 84) (Z01 818)   16  RBBB (right bundle branch block) (426 4) (I45 10)   17  Status post gastric bypass for obesity (V45 86) (Z98 84)   18  Swelling of multiple joints (719 09) (M25 40)    Current Meds   1  Daily Multivitamin TABS; Therapy: (Recorded:45Izc5865) to Recorded   2  Lyrica 150 MG Oral Capsule; TAKE 2 CAPSULES 3 TIMES DAILY; Therapy: (Recorded:08Apr2015) to Recorded   3  Omeprazole 20 MG Oral Capsule Delayed Release; take 1 capsule daily; Therapy: 17GME1415 to (Evaluate:74Hfe2688)  Requested for: 03CAB1007; Last   Rx:20Oct2016 Ordered    Allergies    1   Penicillins    Signatures   Electronically signed by : Jayleen Sarabia, AFSHAN COLE; Jun 6 2017  2:13PM EST                       (Author)

## 2018-01-18 NOTE — PROGRESS NOTES
Active Problems    1  Arthritis (716 90) (M19 90)   2  Benign essential hypertension (401 1) (I10)   3  Blood tests prior to treatment or procedure (V72 63) (Z01 812)   4  Degenerative joint disease (715 90) (M19 90)   5  Diabetes mellitus, type II (250 00) (E11 9)   6  Edema (782 3) (R60 9)   7  Hyperlipemia (272 4) (E78 5)   8  Joint pain (719 40) (M25 50)   9  Morbid obesity (278 01) (E66 01)   10  Neuropathy (355 9) (G62 9)   11  Obesity (278 00) (E66 9)   12  Obstructive sleep apnea (327 23) (G47 33)   13  Other constipation (564 09) (K59 09)   14  Postgastrectomy malabsorption (579 3) (K91 2,Z90 3)   15  Preoperative clearance (V72 84) (Z01 818)   16  RBBB (right bundle branch block) (426 4) (I45 10)   17  Status post gastric bypass for obesity (V45 86) (Z98 84)   18  Swelling of multiple joints (719 09) (M25 40)    Current Meds   1  Daily Multivitamin TABS; Therapy: (Recorded:69Nap8341) to Recorded   2  Lyrica 150 MG Oral Capsule; TAKE 2 CAPSULES 3 TIMES DAILY; Therapy: (Recorded:32Rvo8905) to Recorded   3  Omeprazole 20 MG Oral Capsule Delayed Release; take 1 capsule daily; Therapy: 83NWK1289 to (Evaluate:52Cvf1917)  Requested for: 87OCP0369; Last   Rx:20Oct2016 Ordered    Allergies    1  Penicillins    Discussion/Summary    Attended 9 month follow up meeting  Addressed both behavioral and dietary issues  Group discussion included the impact of tobacco use, alcohol use, psychiatric medications, relationships, body image and self esteem issues as it pertains to the weight loss surgery patient  During group discussion, reviewed vitamin recommendations, protein recommendations, portion sizes, balancing diet to include healthy carbohydrates, importance of exercise, and the bariatric rules for success  Overall pleased with weight loss and improved quality of life        Signatures   Electronically signed by : DOUGLAS Sullivan RD; Jul 20 2017  9:55AM EST                       (Author)

## 2018-04-25 ENCOUNTER — TELEPHONE (OUTPATIENT)
Dept: MEDSURG UNIT | Facility: HOSPITAL | Age: 63
End: 2018-04-25

## 2019-03-18 ENCOUNTER — TELEPHONE (OUTPATIENT)
Dept: BARIATRICS | Facility: CLINIC | Age: 64
End: 2019-03-18

## 2019-03-18 NOTE — TELEPHONE ENCOUNTER
----- Message from Yuli Haney RN sent at 3/1/2019  8:58 AM EST -----  Regarding: schedule 2 year f/u appointment  Please call patient to schedule 2 year f/u appointment with our office

## 2025-07-22 ENCOUNTER — TELEPHONE (OUTPATIENT)
Age: 70
End: 2025-07-22

## 2025-08-11 ENCOUNTER — PREP FOR PROCEDURE (OUTPATIENT)
Dept: PAIN MEDICINE | Facility: CLINIC | Age: 70
End: 2025-08-11

## 2025-08-15 ENCOUNTER — TELEPHONE (OUTPATIENT)
Age: 70
End: 2025-08-15

## 2025-08-26 PROBLEM — G89.4 CHRONIC PAIN SYNDROME: Status: ACTIVE | Noted: 2025-08-26

## (undated) DEVICE — CAST PADDING 4 IN STERILE

## (undated) DEVICE — REM POLYHESIVE ADULT PATIENT RETURN ELECTRODE: Brand: VALLEYLAB

## (undated) DEVICE — PADDING CAST 4 IN  COTTON STRL

## (undated) DEVICE — 3M™ STERI-STRIP™ REINFORCED ADHESIVE SKIN CLOSURES, R1546, 1/4 IN X 4 IN (6 MM X 100 MM), 10 STRIPS/ENVELOPE: Brand: 3M™ STERI-STRIP™

## (undated) DEVICE — CAST PADDING 4 IN UNSTERILE

## (undated) DEVICE — STERILE SLQ FOOT ANKLE PACK: Brand: CARDINAL HEALTH

## (undated) DEVICE — GLOVE INDICATOR PI UNDERGLOVE SZ 7 BLUE

## (undated) DEVICE — ACE WRAP 4 IN UNSTERILE

## (undated) DEVICE — ABDOMINAL PAD: Brand: DERMACEA

## (undated) DEVICE — NEEDLE 18 G X 1 1/2

## (undated) DEVICE — SYRINGE 5ML LL

## (undated) DEVICE — VESSEL LOOPS X-RAY DETECTABLE: Brand: DEROYAL

## (undated) DEVICE — NEEDLE 25G X 1 1/2

## (undated) DEVICE — SUT VICRYL 3-0 PS-2 27 IN J427H

## (undated) DEVICE — ACE WRAP 6 IN UNSTERILE

## (undated) DEVICE — GAUZE SPONGES,16 PLY: Brand: CURITY

## (undated) DEVICE — SUT ETHILON 3-0 PS-1 18 IN 1663H

## (undated) DEVICE — CHLORAPREP HI-LITE 26ML ORANGE

## (undated) DEVICE — GLOVE SRG BIOGEL 7

## (undated) DEVICE — CUFF TOURNIQUET 18 X 4 IN QUICK CONNECT DISP 1 BLADDER

## (undated) DEVICE — SUT ETHILON 4-0 PS-2 18 IN 1667H

## (undated) DEVICE — KERLIX BANDAGE ROLL: Brand: KERLIX

## (undated) DEVICE — OCCLUSIVE GAUZE STRIP,3% BISMUTH TRIBROMOPHENATE IN PETROLATUM BLEND: Brand: XEROFORM

## (undated) DEVICE — GLOVE SRG BIOGEL 6.5

## (undated) DEVICE — GLOVE INDICATOR PI UNDERGLOVE SZ 7.5 BLUE

## (undated) DEVICE — STANDARD SURGICAL GOWN, L: Brand: CONVERTORS

## (undated) DEVICE — SYRINGE 10ML LL

## (undated) DEVICE — INTENDED FOR TISSUE SEPARATION, AND OTHER PROCEDURES THAT REQUIRE A SHARP SURGICAL BLADE TO PUNCTURE OR CUT.: Brand: BARD-PARKER SAFETY BLADES SIZE 15, STERILE

## (undated) DEVICE — GLOVE SRG BIOGEL 7.5

## (undated) DEVICE — BANDAGE, ESMARK LF STR 4"X9'(20/CS): Brand: CYPRESS